# Patient Record
(demographics unavailable — no encounter records)

---

## 2020-02-11 NOTE — P.OP_ITS
Operative Date/Time/Diagnoses    
Date of procedure: 02/11/20    
Time of procedure: 13:49    
Pre-op diagnosis: right inguinal hernia     
    
Post-op diagnosis: other (bilateral inguinal hernia)    
    
 Procedure & Clinicians    
Procedure: Transabdominal preperitoneal repair of bilateral inguinal hernia with  
mesh    
Same procedure as scheduled: Yes    
Indications: 48-year-old female with right-sided symptomatic inguinal hernia   
presents for elective repair.    
Anesthesia Type: General    
Operative Notes    
Findings: Bilateral direct inguinal hernia defect.    
Estimated Blood Loss (mL): 15    
Procedure in detail: The patient was brought to the operating room and placed   
supine on the table. Bilateral sequential compression devices were applied.    
General anesthesia was induced and they were intubated with an endotracheal   
tube. A garcia cath was placed in sterile fashion. They received 2 g ancef prior   
to skin incision. They were prepped and draped in sterile fashion.  A time out   
was performed to ensure the correct patient, procedure and necessary equipment   
within the operating room.  The skin was infiltrated with 0.25% bupivicaine. A 1  
cm infra umbilical midline incision was made.  The umbilical stalk was elevated   
the fascia sharply incised and the abdomen entered traumatically.  A 10mm   
balloon port was placed and pneumoperitoneum was established at 15mm Hg.   
Insepction of the abdomen demonstrated no evidence of injury upon entry.  Two 5   
mm ports were then placed under direct visualization in the right and left lower  
quadrant lateral to the rectus muscle.      
    
A left and right direct hernias were observed.  The right round ligament was   
identified.  The peritoneum 4 cm superior to the deep inguinal ring between the   
medial umbilical ligament and the anterior superior iliac spine was incised. The  
peritoneal flap was retracted and the preperitoneal tissue was dissected off the  
flap beginning lateral to the inferior epigastric artery and towards the ASIS   
and to posterior limit of the psoas muscle to develop the lateral aspect of the   
pocket. Next the peritoneum medial to the inferior epigastric was mobilized   
towards the median umbilical ligament to develop the medial aspect of the pocket  
and the direct defect was reduced. The space of Retzius was fully dissected such  
that the Bandar's ligmament and the pubic symphysis were visible.  Next, the   
peritoneum was mobilized off the round ligament of the uterus and the round   
ligament was ultimately sharpy transected to allow the mesh to sit appropriately  
within the pocket.  A medium Bard 3D Max mesh was then placed into the abdomen   
and positioned such that the myopectineal orifice was completely covered with   
good overlap on all sides.  The mesh was anchored to the pubic tubercle and to   
Bandar?s ligament.  The peritoneal flap was then repositioned back to its   
original position and tacks were used to anchor it in position such that no   
bowel could herniate into the preperitoneal space.  The area was examined for   
hemostasis.      
    
The left inguinal hernia was then repaired in the same fashion.    
    
The 5mm trocars were removed under direct visualization and pneumoperitoneum was  
deflated through the umbilical trocar,  The fascia at the umbilicus was closed   
with 0-Vicryl in figure of 8 fashion, skin closed with 4-0 Monocyl followed by   
Dermabond.  The sponge and instrument count at the end of the case was correct.   
The patient emerged from anesthsia was extubated and transferred to recovery in   
stable condition.    
Complications: none    
Post-operative    
Condition: stable    
Disposition: same day surgery

## 2020-02-11 NOTE — SUR.PHASEII
Addendum entered by Polina Barber R.N.  02/11/20 13:59: 
Pt dressed with only SBA done with her  at her side.  Pt was escorted via [facility] to ED entrance in stable condition.


Addendum entered by Polina Barber R.N.  02/11/20 13:53: 
Pt readied for DC after continue use of IS.  Maintained saturations between 91-96 on RA.  


Original Note:

Pt held r/t having trouble maintaining saturations on room air.  IS was provided, instruction on usage done by TRENT Capellan. Placed on 2L of 02 and on continuous monitoring.  DC pending until pt can maintain sats on room air.

## 2020-02-11 NOTE — SUR.OPER
Supine on padded OR bed, head on pillow, safety belt at thigh, left arm and.  Right arm secured on padded arm oard <90 degrees abduction.  Legs uncrossed.    Tape over blanket to secure lower legs.

## 2020-09-02 NOTE — ED.BACK
"HPI - Back Pain/Injury
<Gala Ibarra PA-C - Last Filed: 09/02/20 21:22>
General
Chief Complaint: Back Pain/Injury
Stated Complaint: right hip pain 7/10/ x4 days
Time Seen by Provider: 09/02/20 14:41
Source: patient
History of Present Illness
HPI Narrative: 49-year-old woman with a history of sciatic back pain presents complaining of similar back pain since Sunday that has become so severe she has had difficulty sleeping and it is disrupting her work.  She has pain in her right low back 
more in her hip or SI joint that she says is radiating down the back of her leg and even to her calf causing some tingling in her calf.  Her pain is  at least a 7/10  She describes it as an intense aching and says she has been having spasming and 
twitching in her right leg.  This feels very similar to when she had this happen previously this winter, at that time she was prescribed prednisone taper and this improved her symptoms they returned in a month and she was scheduled for an MRI but 
this did not happen because of COVID, after 2nd prednisone taper her symptoms resolved and had not returned until 4 days ago.  She has now rescheduled her MRI and is going to be having that early next week to get some answers.  She presents today 
hoping to get some relief for her spasms and discomfort.  She denies loss of bowel or bladder function, fever, chills, headache, abdominal pain, dysuria, hematuria, chest pain, numbness of her hands or feet, saddle paresthesia or any other symptoms 
and has otherwise been in her normal state of health.
MD Complaint: back pain
Onset (ago): day(s) (4)
Duration: constant
Similar Symptoms Previously: Yes
Location: sacrum (SI /joint/hip/low back right)
Severity: severe
Quality: aching and other (shooting down back of leg)
Radiation: buttocks and right leg
Severity scale (1-10): 8
Relieving factors: none
Exacerbating factors: movement, sitting upright and walking
Context: other (No trauma)
Associated symptoms: denies other symptoms
Treatments prior to arrival: cold therapy, NSAIDS, acetaminophen and other (Without relief)
Related Data
Home Medications

 Medication  Instructions  Recorded  Confirmed
escitalopram oxalate 10 mg tablet 10 mg PO DAILY 01/29/20 02/26/20
lisinopril 10 mg tablet 10 mg PO DAILY 01/29/20 02/26/20
ranitidine HCl 150 mg capsule 150 mg PO BEDTIME 01/29/20 02/26/20

Previous Rx's

 Medication  Instructions  Recorded
acetaminophen [Tylenol] 650 mg PO QID PRN #60 cap 02/11/20
oxycodone 5 mg PO Q6H PRN #30 tab 02/11/20
baclofen 5 mg PO TID #20 tab 09/02/20
methylprednisolone [Medrol (Nitesh)] See Rx Instructions .ROUTE 09/02/20
 .COMPLEX #21 each 


Allergies

Allergy/AdvReac Type Severity Reaction Status Date / Time
No Known Drug Allergies Allergy   Verified 02/26/20 10:49



Review of Systems
<Gala Ibarra PA-C - Last Filed: 09/02/20 21:22>
Review of Systems
Narrative: GENERAL: Denies chills, fatigue, malaise, fever, sweats. 
HEENT: Denies sinus pain, ear pain, sore throat, difficulty swallowing, dizziness.
RESPIRATORY: Denies dyspnea, cough, wheezing, hemoptysis, sputum.
CARDIOVASCULAR: Denies chest pain, palpitations, orthopnea, edema, 
GASTROINTESTINAL: Denies nausea, vomiting, abdominal pain, diarrhea, constipation, melena.
: Denies dysuria, frequency, incontinence, hematuria, urinary retention.
MUSCULOSKELETAL:  Positive for pain in her right low back SI joint and hip radiating down the back of her right leg denies weakness, joint pain, or bony pain
SKIN: Denies rash, skin lesions, or other
NEUROLOGIC: Denies weakness, headache, numbness, change in speech, confusion, seizures, incoordination.
PSYCHIATRIC: No concerning psychosocial issues.


12 point review of systems is negative except for those stated above

Patient History
<Gala Ibarra PA-C - Last Filed: 09/02/20 21:22>
Medical History (Reviewed 09/02/20 @ 16:19 by Gala Ibarra PA-C)

Anxiety (Acute)
HTN (hypertension) (Acute)


Surgical History (
[id number]|2020-09-02 14:45:00|2020-09-02 14:45:00|[facility]|Emergency Department|[id number]|"HPI - Back Pain/Injury